# Patient Record
Sex: MALE | Race: BLACK OR AFRICAN AMERICAN | NOT HISPANIC OR LATINO | Employment: FULL TIME | URBAN - METROPOLITAN AREA
[De-identification: names, ages, dates, MRNs, and addresses within clinical notes are randomized per-mention and may not be internally consistent; named-entity substitution may affect disease eponyms.]

---

## 2017-01-01 ENCOUNTER — APPOINTMENT (OUTPATIENT)
Dept: LAB | Facility: MEDICAL CENTER | Age: 52
End: 2017-01-01
Attending: INTERNAL MEDICINE

## 2017-01-01 ENCOUNTER — HOSPITAL ENCOUNTER (OUTPATIENT)
Dept: LAB | Facility: MEDICAL CENTER | Age: 52
End: 2017-01-01
Attending: INTERNAL MEDICINE
Payer: COMMERCIAL

## 2017-01-02 LAB — VANCOMYCIN TROUGH SERPL-MCNC: 16.7 UG/ML (ref 10–20)

## 2017-01-03 LAB
BASOPHILS # BLD AUTO: 0.07 K/UL (ref 0–0.12)
BASOPHILS NFR BLD AUTO: 1.5 % (ref 0–1.8)
EOSINOPHIL # BLD: 0.46 K/UL (ref 0–0.51)
EOSINOPHIL NFR BLD AUTO: 9.8 % (ref 0–6.9)
ERYTHROCYTE [DISTWIDTH] IN BLOOD BY AUTOMATED COUNT: 44.8 FL (ref 35.9–50)
HCT VFR BLD AUTO: 31.7 % (ref 42–52)
HGB BLD-MCNC: 11.5 G/DL (ref 14–18)
IMM GRANULOCYTES # BLD AUTO: 0.01 K/UL (ref 0–0.11)
IMM GRANULOCYTES NFR BLD AUTO: 0.2 % (ref 0–0.9)
LYMPHOCYTES # BLD: 1.56 K/UL (ref 1–4.8)
LYMPHOCYTES NFR BLD AUTO: 33.3 % (ref 22–41)
MCH RBC QN AUTO: 33 PG (ref 27–33)
MCHC RBC AUTO-ENTMCNC: 36.3 G/DL (ref 33.7–35.3)
MCV RBC AUTO: 91.1 FL (ref 81.4–97.8)
MONOCYTES # BLD: 0.82 K/UL (ref 0–0.85)
MONOCYTES NFR BLD AUTO: 17.5 % (ref 0–13.4)
NEUTROPHILS # BLD: 1.76 K/UL (ref 1.82–7.42)
NEUTROPHILS NFR BLD AUTO: 37.7 % (ref 44–72)
NRBC # BLD AUTO: 0 K/UL
NRBC BLD-RTO: 0 /100 WBC
PLATELET # BLD AUTO: 175 K/UL (ref 164–446)
PMV BLD AUTO: 10.3 FL (ref 9–12.9)
RBC # BLD AUTO: 3.48 M/UL (ref 4.7–6.1)
WBC # BLD AUTO: 4.7 K/UL (ref 4.8–10.8)

## 2017-01-03 PROCEDURE — 36415 COLL VENOUS BLD VENIPUNCTURE: CPT

## 2017-01-03 PROCEDURE — 85025 COMPLETE CBC W/AUTO DIFF WBC: CPT

## 2017-01-06 LAB
BASOPHILS # BLD AUTO: 0.05 K/UL (ref 0–0.12)
BASOPHILS NFR BLD AUTO: 1.5 % (ref 0–1.8)
EOSINOPHIL # BLD: 0.34 K/UL (ref 0–0.51)
EOSINOPHIL NFR BLD AUTO: 10.4 % (ref 0–6.9)
ERYTHROCYTE [DISTWIDTH] IN BLOOD BY AUTOMATED COUNT: 46.6 FL (ref 35.9–50)
HCT VFR BLD AUTO: 38.1 % (ref 42–52)
HGB BLD-MCNC: 13.1 G/DL (ref 14–18)
IMM GRANULOCYTES # BLD AUTO: 0.01 K/UL (ref 0–0.11)
IMM GRANULOCYTES NFR BLD AUTO: 0.3 % (ref 0–0.9)
LYMPHOCYTES # BLD: 1.38 K/UL (ref 1–4.8)
LYMPHOCYTES NFR BLD AUTO: 42.2 % (ref 22–41)
MCH RBC QN AUTO: 30.9 PG (ref 27–33)
MCHC RBC AUTO-ENTMCNC: 34.4 G/DL (ref 33.7–35.3)
MCV RBC AUTO: 89.9 FL (ref 81.4–97.8)
MONOCYTES # BLD: 0.39 K/UL (ref 0–0.85)
MONOCYTES NFR BLD AUTO: 11.9 % (ref 0–13.4)
NEUTROPHILS # BLD: 1.1 K/UL (ref 1.82–7.42)
NEUTROPHILS NFR BLD AUTO: 33.7 % (ref 44–72)
NRBC # BLD AUTO: 0 K/UL
NRBC BLD-RTO: 0 /100 WBC
PLATELET # BLD AUTO: 180 K/UL (ref 164–446)
PMV BLD AUTO: 10.1 FL (ref 9–12.9)
RBC # BLD AUTO: 4.24 M/UL (ref 4.7–6.1)
WBC # BLD AUTO: 3.3 K/UL (ref 4.8–10.8)

## 2017-01-06 PROCEDURE — 85025 COMPLETE CBC W/AUTO DIFF WBC: CPT

## 2017-01-06 PROCEDURE — 36415 COLL VENOUS BLD VENIPUNCTURE: CPT

## 2017-01-06 PROCEDURE — 87040 BLOOD CULTURE FOR BACTERIA: CPT

## 2017-01-11 LAB
BACTERIA BLD CULT: NORMAL
BACTERIA BLD CULT: NORMAL
BASOPHILS # BLD AUTO: 0.05 K/UL (ref 0–0.12)
BASOPHILS NFR BLD AUTO: 1.4 % (ref 0–1.8)
EOSINOPHIL # BLD: 0.34 K/UL (ref 0–0.51)
EOSINOPHIL NFR BLD AUTO: 9.2 % (ref 0–6.9)
ERYTHROCYTE [DISTWIDTH] IN BLOOD BY AUTOMATED COUNT: 45.8 FL (ref 35.9–50)
HCT VFR BLD AUTO: 38.1 % (ref 42–52)
HGB BLD-MCNC: 12.4 G/DL (ref 14–18)
IMM GRANULOCYTES # BLD AUTO: 0.01 K/UL (ref 0–0.11)
IMM GRANULOCYTES NFR BLD AUTO: 0.3 % (ref 0–0.9)
LYMPHOCYTES # BLD: 1.54 K/UL (ref 1–4.8)
LYMPHOCYTES NFR BLD AUTO: 41.7 % (ref 22–41)
MCH RBC QN AUTO: 28.6 PG (ref 27–33)
MCHC RBC AUTO-ENTMCNC: 32.5 G/DL (ref 33.7–35.3)
MCV RBC AUTO: 88 FL (ref 81.4–97.8)
MONOCYTES # BLD: 0.54 K/UL (ref 0–0.85)
MONOCYTES NFR BLD AUTO: 14.6 % (ref 0–13.4)
NEUTROPHILS # BLD: 1.21 K/UL (ref 1.82–7.42)
NEUTROPHILS NFR BLD AUTO: 32.8 % (ref 44–72)
NRBC # BLD AUTO: 0 K/UL
NRBC BLD-RTO: 0 /100 WBC
PLATELET # BLD AUTO: 153 K/UL (ref 164–446)
PMV BLD AUTO: 10.2 FL (ref 9–12.9)
RBC # BLD AUTO: 4.33 M/UL (ref 4.7–6.1)
SIGNIFICANT IND 70042: NORMAL
SIGNIFICANT IND 70042: NORMAL
SOURCE SOURCE: NORMAL
SOURCE SOURCE: NORMAL
WBC # BLD AUTO: 3.7 K/UL (ref 4.8–10.8)

## 2017-01-11 PROCEDURE — 85025 COMPLETE CBC W/AUTO DIFF WBC: CPT

## 2017-01-11 PROCEDURE — 36415 COLL VENOUS BLD VENIPUNCTURE: CPT

## 2017-01-12 ENCOUNTER — APPOINTMENT (OUTPATIENT)
Dept: INFECTIOUS DISEASES | Facility: MEDICAL CENTER | Age: 52
End: 2017-01-12
Payer: COMMERCIAL

## 2017-01-12 LAB — VANCOMYCIN TROUGH SERPL-MCNC: 34.5 UG/ML (ref 10–20)

## 2017-01-12 PROCEDURE — 80202 ASSAY OF VANCOMYCIN: CPT

## 2017-01-14 LAB
BASOPHILS # BLD AUTO: 0.05 K/UL (ref 0–0.12)
BASOPHILS NFR BLD AUTO: 1.2 % (ref 0–1.8)
EOSINOPHIL # BLD: 0.35 K/UL (ref 0–0.51)
EOSINOPHIL NFR BLD AUTO: 8.5 % (ref 0–6.9)
ERYTHROCYTE [DISTWIDTH] IN BLOOD BY AUTOMATED COUNT: 47.6 FL (ref 35.9–50)
HCT VFR BLD AUTO: 37.3 % (ref 42–52)
HGB BLD-MCNC: 12.5 G/DL (ref 14–18)
IMM GRANULOCYTES # BLD AUTO: 0 K/UL (ref 0–0.11)
IMM GRANULOCYTES NFR BLD AUTO: 0 % (ref 0–0.9)
LYMPHOCYTES # BLD: 1.66 K/UL (ref 1–4.8)
LYMPHOCYTES NFR BLD AUTO: 40.2 % (ref 22–41)
MCH RBC QN AUTO: 29.8 PG (ref 27–33)
MCHC RBC AUTO-ENTMCNC: 33.5 G/DL (ref 33.7–35.3)
MCV RBC AUTO: 88.8 FL (ref 81.4–97.8)
MONOCYTES # BLD: 0.53 K/UL (ref 0–0.85)
MONOCYTES NFR BLD AUTO: 12.8 % (ref 0–13.4)
NEUTROPHILS # BLD: 1.54 K/UL (ref 1.82–7.42)
NEUTROPHILS NFR BLD AUTO: 37.3 % (ref 44–72)
NRBC # BLD AUTO: 0 K/UL
NRBC BLD-RTO: 0 /100 WBC
PLATELET # BLD AUTO: 153 K/UL (ref 164–446)
PMV BLD AUTO: 10.5 FL (ref 9–12.9)
RBC # BLD AUTO: 4.2 M/UL (ref 4.7–6.1)
VANCOMYCIN TROUGH SERPL-MCNC: 11.8 UG/ML (ref 10–20)
WBC # BLD AUTO: 4.1 K/UL (ref 4.8–10.8)

## 2017-01-14 PROCEDURE — 80202 ASSAY OF VANCOMYCIN: CPT

## 2017-01-14 PROCEDURE — 36415 COLL VENOUS BLD VENIPUNCTURE: CPT

## 2017-01-14 PROCEDURE — 85025 COMPLETE CBC W/AUTO DIFF WBC: CPT

## 2017-01-18 LAB — VANCOMYCIN TROUGH SERPL-MCNC: 17.3 UG/ML (ref 10–20)

## 2017-01-18 PROCEDURE — 80202 ASSAY OF VANCOMYCIN: CPT

## 2017-01-21 ENCOUNTER — HOSPITAL ENCOUNTER (OUTPATIENT)
Dept: RADIOLOGY | Facility: MEDICAL CENTER | Age: 52
End: 2017-01-21
Attending: INTERNAL MEDICINE
Payer: COMMERCIAL

## 2017-01-21 DIAGNOSIS — R22.42 LOCALIZED SWELLING, MASS AND LUMP, LEFT LOWER LIMB: ICD-10-CM

## 2017-01-21 PROCEDURE — 76882 US LMTD JT/FCL EVL NVASC XTR: CPT | Mod: LT

## 2017-01-25 ENCOUNTER — PATIENT OUTREACH (OUTPATIENT)
Dept: HEALTH INFORMATION MANAGEMENT | Facility: OTHER | Age: 52
End: 2017-01-25

## 2017-02-21 ENCOUNTER — OFFICE VISIT (OUTPATIENT)
Dept: INFECTIOUS DISEASES | Facility: MEDICAL CENTER | Age: 52
End: 2017-02-21
Payer: COMMERCIAL

## 2017-02-21 VITALS
HEIGHT: 69 IN | HEART RATE: 80 BPM | BODY MASS INDEX: 31.37 KG/M2 | WEIGHT: 211.8 LBS | DIASTOLIC BLOOD PRESSURE: 90 MMHG | TEMPERATURE: 98.9 F | SYSTOLIC BLOOD PRESSURE: 140 MMHG | OXYGEN SATURATION: 91 %

## 2017-02-21 DIAGNOSIS — B95.62 MRSA BACTEREMIA: ICD-10-CM

## 2017-02-21 DIAGNOSIS — R78.81 MRSA BACTEREMIA: ICD-10-CM

## 2017-02-21 DIAGNOSIS — G06.2 EPIDURAL ABSCESS: Primary | ICD-10-CM

## 2017-02-21 PROCEDURE — 99213 OFFICE O/P EST LOW 20 MIN: CPT | Performed by: INTERNAL MEDICINE

## 2017-02-21 NOTE — MR AVS SNAPSHOT
"Brennan Nwobodo   2017 11:00 AM   Office Visit   MRN: [id number]    Department:  [facility]   Dept Phone:  [phone]    Description:  Male : 1965   Provider:  Mami Julien M.D.           Reason for Visit     Follow-Up MRSA bacteremia and lumbar epidural abscess      Allergies as of 2017     No Known Allergies      Vital Signs     Blood Pressure Pulse Temperature Height Weight Body Mass Index    140/90 mmHg 80 37.2 °C (98.9 °F) 1.758 m (5' 9.21\") 96.072 kg (211 lb 12.8 oz) 31.09 kg/m2    Oxygen Saturation Smoking Status                91% Never Smoker           Basic Information     Date Of Birth Sex Race Ethnicity Preferred Language    1965 Male Black or  Non- English      Problem List              ICD-10-CM Priority Class Noted - Resolved    Epidural abscess due to MRSA G06.2 High  2016 - Present    MRSA bacteremia R78.81 High  2016 - Present    HTN (hypertension) I10 Low  2016 - Present    Hyponatremia E87.1 Medium  2016 - Present    Hypokalemia E87.6 Medium  2016 - Present    Thrombocytopenia (CMS-HCC) D69.6 Low  2016 - Present    Constipation K59.00 Medium  2016 - Present    Genital herpes (Chronic) A60.00 Low  2016 - Present    Stool guaiac positive R19.5 Medium  2016 - Present    Migraine G43.909 Medium  2016 - Present      Health Maintenance        Date Due Completion Dates    IMM DTaP/Tdap/Td Vaccine (1 - Tdap) 1984 ---    COLONOSCOPY 2015 ---            Current Immunizations     Influenza Vaccine Adult HD 2016      Below and/or attached are the medications your provider expects you to take. Review all of your home medications and newly ordered medications with your provider and/or pharmacist. Follow medication instructions as directed by your provider and/or pharmacist. Please keep your medication list with you and share with your provider. Update the information " when medications are discontinued, doses are changed, or new medications (including over-the-counter products) are added; and carry medication information at all times in the event of emergency situations     Allergies:  No Known Allergies          Medications  Valid as of: February 21, 2017 - 11:13 AM    Generic Name Brand Name Tablet Size Instructions for use    Acetaminophen (Tab) TYLENOL 325 MG Take 2 Tabs by mouth every 6 hours as needed for Fever or Moderate Pain (temp >101.5F).        Carvedilol (Tab) COREG 6.25 MG Take 1 Tab by mouth 2 times a day, with meals.        Gabapentin (Cap) NEURONTIN 100 MG Take 1 Cap by mouth 3 times a day.        Morphine Sulfate (Tab CR) MS CONTIN 15 MG Take 1 Tab by mouth every 12 hours.        NS SOLN 250 mL with vancomycin 10 GM SOLR 1,200 mg   1,200 mg by Intravenous route every 8 hours.        Omeprazole (CAPSULE DELAYED RELEASE) PRILOSEC 20 MG Take 1 Cap by mouth 2 Times a Day.        Ondansetron (TABLET DISPERSIBLE) ZOFRAN ODT 4 MG Take 1 Tab by mouth every four hours as needed for Nausea/Vomiting (give PO if no IV route available).        RifAMPin (Cap) RIFADINE 300 MG Take 1 Cap by mouth 2 Times a Day.        TraMADol HCl (Tab) ULTRAM 50 MG Take 1 Tab by mouth every 6 hours as needed (headache).        .                 Medicines prescribed today were sent to:     [facility] [address]    Phone: [phone] Fax: [phone]    Open 24 Hours?: No      Medication refill instructions:       If your prescription bottle indicates you have medication refills left, it is not necessary to call your provider’s office. Please contact your pharmacy and they will refill your medication.    If your prescription bottle indicates you do not have any refills left, you may request refills at any time through one of the following ways: The online Travel Notes system (except Urgent Care), by calling  your provider’s office, or by asking your pharmacy to contact your provider’s office with a refill request. Medication refills are processed only during regular business hours and may not be available until the next business day. Your provider may request additional information or to have a follow-up visit with you prior to refilling your medication.   *Please Note: Medication refills are assigned a new Rx number when refilled electronically. Your pharmacy may indicate that no refills were authorized even though a new prescription for the same medication is available at the pharmacy. Please request the medicine by name with the pharmacy before contacting your provider for a refill.           famPlus Access Code: [email or web address]  Expires: 3/11/2017  2:44 PM    famPlus  A secure, online tool to manage your health information     [employer]’s famPlus® is a secure, online tool that connects you to your personalized health information from the privacy of your home -- day or night - making it very easy for you to manage your healthcare. Once the activation process is completed, you can even access your medical information using the famPlus mt, which is available for free in the Apple Mt store or Google Play store.     famPlus provides the following levels of access (as shown below):   My Chart Features   [facility] Doctor [facility]  Urgent  Care [facility]  Primary Care  Doctor   Email your healthcare team securely and privately 24/7 X X X    Manage appointments: schedule your next appointment; view details of past/upcoming appointments X      Request prescription refills. X      View recent personal medical records, including lab and immunizations X X X X   View health record, including health history, allergies, medications X X X X   Read reports about your outpatient visits, procedures, consult and ER notes X X X X   See your discharge summary, which is a recap of your hospital and/or  ER visit that includes your diagnosis, lab results, and care plan. X X       How to register for Viamericas:  1. Go to  [email or web address].  2. Click on the Sign Up Now box, which takes you to the New Member Sign Up page. You will need to provide the following information:  a. Enter your Viamericas Access Code exactly as it appears at the top of this page. (You will not need to use this code after you’ve completed the sign-up process. If you do not sign up before the expiration date, you must request a new code.)   b. Enter your date of birth.   c. Enter your home email address.   d. Click Submit, and follow the next screen’s instructions.  3. Create a IroFitt ID. This will be your IroFitt login ID and cannot be changed, so think of one that is secure and easy to remember.  4. Create a IroFitt password. You can change your password at any time.  5. Enter your Password Reset Question and Answer. This can be used at a later time if you forget your password.   6. Enter your e-mail address. This allows you to receive e-mail notifications when new information is available in Viamericas.  7. Click Sign Up. You can now view your health information.    For assistance activating your Viamericas account, call [phone]

## 2017-02-21 NOTE — PROGRESS NOTES
Infectious Disease Follow up Note    DOS 2/21/17      Subjective:     Chief Complaint   Patient presents with   • Follow-Up     MRSA bacteremia and lumbar epidural abscess         Interval History:   51 y.o. BM previously healthy with recent travel to [place] transferred from an OSH with MRSA bacteremia and lumbar epidural abscess in November 2016. Source of MRSA unclear. HIV neg  Afebrile  11/25-27 Bcx + MRSA  11/28 Bcx - NGTD  11/25 s/p decompressive L2-5 laminectomy and drainage of epidural abscess. Pus and phlegmon seen intraoperatively. OR cx - MRSA  TTE and JESICA - negative  Continue vancomycin and rif   He was transferred to SNF to complete his abx on 01/23/17.    Hospital records reviewed    Pt transferred to SNF to complete abx course. He was d/c from SNF on 01/24/17. Here for follow up today and doing well. He still has some lower back pain but rates it 1-2/10. He is doing some exercises and walking 1 mile but tires thereafter. He saw his PCP one week ago for follow up as well. He noted soft tissue swelling inferior to his left knee which was aspirated in the clinic and revealed bloody drainage. He denies any fevers, chills or abdominal pain. Appetite is greatly improved.      Past Medical History   Diagnosis Date   • Sinus infection 2015       Past Surgical History   Procedure Laterality Date   • Lumbar laminectomy diskectomy  11/26/2016     Procedure: LUMBAR LAMINECTOMY WITH EVACUATION OF EPIDURAL ABSCESS;  Surgeon: Gerald Mayer M.D.;  Location: SURGERY [facility];  Service:        Allergies: No Known Allergies      Medications:  Current Outpatient Prescriptions on File Prior to Visit   Medication Sig Dispense Refill   • tramadol (ULTRAM) 50 MG Tab Take 1 Tab by mouth every 6 hours as needed (headache). 30 Tab 0   • acetaminophen (TYLENOL) 325 MG Tab Take 2 Tabs by mouth every 6 hours as needed for Fever or Moderate Pain (temp >101.5F). 30 Tab 0   • gabapentin (NEURONTIN) 100 MG Cap Take 1 Cap by  "mouth 3 times a day. (Patient taking differently: Take 300 mg by mouth 3 times a day.) 90 Cap    • rifampin (RIFADINE) 300 MG Cap Take 1 Cap by mouth 2 Times a Day. 60 Cap 0   • carvedilol (COREG) 6.25 MG Tab Take 1 Tab by mouth 2 times a day, with meals. 60 Tab    • morphine ER (MS CONTIN) 15 MG Tab CR tablet Take 1 Tab by mouth every 12 hours. 60 Tab    • ondansetron (ZOFRAN ODT) 4 MG TABLET DISPERSIBLE Take 1 Tab by mouth every four hours as needed for Nausea/Vomiting (give PO if no IV route available). 10 Tab 0   • NS SOLN 250 mL with vancomycin 10 GM SOLR 1,200 mg 1,200 mg by Intravenous route every 8 hours.     • omeprazole (PRILOSEC) 20 MG delayed-release capsule Take 1 Cap by mouth 2 Times a Day. 30 Cap      No current facility-administered medications on file prior to visit.         ROS  As documented above in my HPI       Objective:     PE:  /90 mmHg  Pulse 80  Temp(Src) 37.2 °C (98.9 °F)  Ht 1.758 m (5' 9.21\")  Wt 96.072 kg (211 lb 12.8 oz)  BMI 31.09 kg/m2  SpO2 91%     Vital signs reviewed  Constitutional: patient is oriented to person, place, and time. Appears well-developed and well-nourished. No distress  Eyes: Conjunctivae normal and EOM are normal. Pupils are equal, round, and reactive to light.   Mouth/Throat: Lips without lesions, good dentition, oropharynx is clear and moist.  Neck: Trachea midline. Normal range of motion. Neck supple. No masses  Cardiovascular: Normal rate, regular rhythm, normal heart sounds and intact distal pulses. No murmur, gallop, or friction rub. No edema.  Pulmonary/Chest: No respiratory distress. Unlabored respiratory effort, lungs clear to auscultation. No wheezes or rales.   Abdominal: Soft, non tender. BS + x 4. No masses or hepatosplenomegaly.   Musculoskeletal: Normal range of motion. No tenderness, swelling, erythema, deformity noted. Inferior soft tissue swelling/fluctuance inferior to left knee. No erythema or TTP.  Back: surgical site clean and " well healed. No drainage or erythema  Neurological: alert and oriented to person, place, and time. No cranial nerve deficit. Coordination normal.   Skin: Skin is warm and dry. Good turgor. No rashes visable.  Psychiatric: Normal mood and affect. Behavior is normal.     LABS:  WBC   Date/Time Value Ref Range Status   01/14/2017 02:24 AM 4.1* 4.8 - 10.8 K/uL Final     RBC   Date/Time Value Ref Range Status   01/14/2017 02:24 AM 4.20* 4.70 - 6.10 M/uL Final     HEMOGLOBIN   Date/Time Value Ref Range Status   01/14/2017 02:24 AM 12.5* 14.0 - 18.0 g/dL Final     HEMATOCRIT   Date/Time Value Ref Range Status   01/14/2017 02:24 AM 37.3* 42.0 - 52.0 % Final     MCV   Date/Time Value Ref Range Status   01/14/2017 02:24 AM 88.8 81.4 - 97.8 fL Final     MCH   Date/Time Value Ref Range Status   01/14/2017 02:24 AM 29.8 27.0 - 33.0 pg Final     MCHC   Date/Time Value Ref Range Status   01/14/2017 02:24 AM 33.5* 33.7 - 35.3 g/dL Final     MPV   Date/Time Value Ref Range Status   01/14/2017 02:24 AM 10.5 9.0 - 12.9 fL Final        SODIUM   Date/Time Value Ref Range Status   12/17/2016 02:12 * 135 - 145 mmol/L Final     POTASSIUM   Date/Time Value Ref Range Status   12/17/2016 02:12 AM 3.9 3.6 - 5.5 mmol/L Final     CHLORIDE   Date/Time Value Ref Range Status   12/17/2016 02:12 AM 97 96 - 112 mmol/L Final     CO2   Date/Time Value Ref Range Status   12/17/2016 02:12 AM 27 20 - 33 mmol/L Final     GLUCOSE   Date/Time Value Ref Range Status   12/17/2016 02:12 * 65 - 99 mg/dL Final     BUN   Date/Time Value Ref Range Status   12/17/2016 02:12 AM 11 8 - 22 mg/dL Final     CREATININE   Date/Time Value Ref Range Status   12/17/2016 02:12 AM 0.95 0.50 - 1.40 mg/dL Final     ALKALINE PHOSPHATASE   Date/Time Value Ref Range Status   12/14/2016 01:37 PM 66 30 - 99 U/L Final     AST(SGOT)   Date/Time Value Ref Range Status   12/14/2016 01:37 PM 18 12 - 45 U/L Final     ALT(SGPT)   Date/Time Value Ref Range Status   12/14/2016  01:37 PM 23 2 - 50 U/L Final     TOTAL BILIRUBIN   Date/Time Value Ref Range Status   12/14/2016 01:37 PM 0.3 0.1 - 1.5 mg/dL Final        No results found for: CPKTOTAL     MICRO:  BLOOD CULTURE   Date Value Ref Range Status   01/06/2017 No growth after 5 days of incubation.  Final   01/06/2017 No growth after 5 days of incubation.  Final          Assessment:   50 yo man admitted in November 2016 with MRSA bacteremia and lumbar epidural abscess. Source of MRSA unclear. S/p decompressive L2-5 laminectomy and drainage of epidural abscess on 11/25/16. Pus and phlegmon seen intraoperatively. OR cx - MRSA    Plan:     Problem List Items Addressed This Visit     Epidural abscess due to MRSA - Primary.  resolved    MRSA bacteremia. Resolved and treated      Completed vancomycin and rif on 01/23/17    Doing well off abx with significant improvement of back pain  Monitor left knee soft tissue swelling - no evidence of infection at this time    Follow up: PRN, RTC sooner if needed. FU with PCP for ongoing chronic medical conditions.     Mami Julien M.D.  2/21/2017

## 2018-05-01 DIAGNOSIS — R53.1 WEAKNESS: ICD-10-CM

## 2018-05-11 LAB — EKG IMPRESSION: NORMAL
